# Patient Record
Sex: FEMALE | Race: WHITE | NOT HISPANIC OR LATINO | URBAN - METROPOLITAN AREA
[De-identification: names, ages, dates, MRNs, and addresses within clinical notes are randomized per-mention and may not be internally consistent; named-entity substitution may affect disease eponyms.]

---

## 2020-10-16 ENCOUNTER — IMPORTED ENCOUNTER (OUTPATIENT)
Dept: URBAN - METROPOLITAN AREA CLINIC 1 | Facility: CLINIC | Age: 80
End: 2020-10-16

## 2020-10-16 PROCEDURE — 92015 DETERMINE REFRACTIVE STATE: CPT

## 2020-10-16 PROCEDURE — 92004 COMPRE OPH EXAM NEW PT 1/>: CPT

## 2020-10-16 NOTE — PATIENT DISCUSSION
PCO OU -- (Posterior Capsule Opacification) OD>OS. Observe and consider yag cap when pt feels pco visually significant and visual acuity decreases to appropriate level.

## 2020-10-16 NOTE — PATIENT DISCUSSION
1.  PCO OU -- Visually Significant secondary to glare; schedule YAG Cap. Pros cons risks and benefits. OD then OS. PMG saw today. 2.  Glaucoma Suspect OU -- (C/D: 0.80/0.30) IOP: 18 OU. W/u and testing after Yag Cap. 3.  ARMD OD Early/dry/stable. Importance of daily AREDS II study multivitamin and Amsler Grid checks discussed with patient. Patient to follow-up immediately with any new onset of decreased vision and/or metamorphopsia. 4. Pseudophakia OU -- (2012 Dr. Shantell Mackay) Doing well. 5.  Dermatochalasis OU UL's -- Follow with no intervention at this time. 6. 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 Return for an appointment in [facility] 274 then OS with Dr. Nikki Rodriguez.

## 2020-10-19 PROBLEM — H02.831: Noted: 2020-10-16

## 2020-10-19 PROBLEM — H40.023: Noted: 2020-10-16

## 2020-10-19 PROBLEM — Z96.1: Noted: 2020-10-16

## 2020-10-19 PROBLEM — H26.493: Noted: 2020-10-16

## 2020-10-19 PROBLEM — H35.3111: Noted: 2020-10-19

## 2020-10-19 PROBLEM — H02.834: Noted: 2020-10-16

## 2020-11-16 ENCOUNTER — IMPORTED ENCOUNTER (OUTPATIENT)
Dept: URBAN - NONMETROPOLITAN AREA CLINIC 1 | Facility: CLINIC | Age: 80
End: 2020-11-16

## 2020-11-16 PROBLEM — H26.491: Noted: 2020-11-16

## 2020-11-16 PROBLEM — Z96.1: Noted: 2020-11-16

## 2020-11-16 PROCEDURE — 66821 AFTER CATARACT LASER SURGERY: CPT

## 2020-11-16 PROCEDURE — 92004 COMPRE OPH EXAM NEW PT 1/>: CPT

## 2020-11-16 NOTE — PATIENT DISCUSSION
Pseudophakia OU w/ PCO OD - Both intraocular lenses are stable - PCO noted OD discussed signs and symptoms associated. Pt has noticed decreased VA OD.- Explained PCO and RBAs of YAG Capsulotomy to pt. - Pt elects to proceed.  YAG Caps OD today- RTC in 1-2 weeks for POV s/p YAG PC OD

## 2020-11-30 ENCOUNTER — IMPORTED ENCOUNTER (OUTPATIENT)
Dept: URBAN - NONMETROPOLITAN AREA CLINIC 1 | Facility: CLINIC | Age: 80
End: 2020-11-30

## 2020-11-30 PROBLEM — H26.492: Noted: 2020-11-30

## 2020-11-30 PROBLEM — Z96.1: Noted: 2020-11-16

## 2020-11-30 PROCEDURE — 99024 POSTOP FOLLOW-UP VISIT: CPT

## 2020-11-30 NOTE — PATIENT DISCUSSION
Pseudophakia OU - Both intraocular lenses are stable - S/P YAG PC OD - open capsule - Recommend patient use OTC readers for now; patient to call or come in ASAP if any changes in vision noted from today.- RTC 1 year for annual complete eye exam

## 2022-04-02 ASSESSMENT — VISUAL ACUITY
OS_CC: 20/25
OS_GLARE: 20/60
OD_GLARE: 20/60
OD_CC: 20/50

## 2022-04-02 ASSESSMENT — TONOMETRY
OS_IOP_MMHG: 18
OD_IOP_MMHG: 18

## 2022-04-09 ASSESSMENT — TONOMETRY
OS_IOP_MMHG: 16
OS_IOP_MMHG: 17
OD_IOP_MMHG: 17
OD_IOP_MMHG: 17

## 2022-04-09 ASSESSMENT — VISUAL ACUITY
OU_CC: J1+
OD_CC: 20/40-2
OS_CC: 20/20
OD_CC: 20/40+1
OS_CC: 20/25